# Patient Record
Sex: FEMALE | Race: BLACK OR AFRICAN AMERICAN | NOT HISPANIC OR LATINO | URBAN - METROPOLITAN AREA
[De-identification: names, ages, dates, MRNs, and addresses within clinical notes are randomized per-mention and may not be internally consistent; named-entity substitution may affect disease eponyms.]

---

## 2017-08-23 ENCOUNTER — EMERGENCY (EMERGENCY)
Facility: HOSPITAL | Age: 62
LOS: 1 days | Discharge: HOME | End: 2017-08-23
Attending: EMERGENCY MEDICINE
Payer: MEDICAID

## 2017-08-23 VITALS
TEMPERATURE: 98 F | HEIGHT: 69 IN | RESPIRATION RATE: 18 BRPM | DIASTOLIC BLOOD PRESSURE: 88 MMHG | OXYGEN SATURATION: 96 % | WEIGHT: 210.1 LBS | SYSTOLIC BLOOD PRESSURE: 142 MMHG | HEART RATE: 96 BPM

## 2017-08-23 PROCEDURE — 73562 X-RAY EXAM OF KNEE 3: CPT | Mod: 26,50

## 2017-08-23 PROCEDURE — 72100 X-RAY EXAM L-S SPINE 2/3 VWS: CPT | Mod: 26

## 2017-08-23 PROCEDURE — 99284 EMERGENCY DEPT VISIT MOD MDM: CPT

## 2017-08-23 PROCEDURE — 73630 X-RAY EXAM OF FOOT: CPT | Mod: 26,LT

## 2017-08-23 PROCEDURE — 73590 X-RAY EXAM OF LOWER LEG: CPT | Mod: 26,RT

## 2017-08-23 RX ORDER — CYCLOBENZAPRINE HYDROCHLORIDE 10 MG/1
10 TABLET, FILM COATED ORAL ONCE
Qty: 0 | Refills: 0 | Status: COMPLETED | OUTPATIENT
Start: 2017-08-23 | End: 2017-08-23

## 2017-08-23 RX ORDER — OXYCODONE AND ACETAMINOPHEN 5; 325 MG/1; MG/1
1 TABLET ORAL ONCE
Qty: 0 | Refills: 0 | Status: DISCONTINUED | OUTPATIENT
Start: 2017-08-23 | End: 2017-08-23

## 2017-08-23 RX ORDER — IBUPROFEN 200 MG
600 TABLET ORAL ONCE
Qty: 0 | Refills: 0 | Status: COMPLETED | OUTPATIENT
Start: 2017-08-23 | End: 2017-08-23

## 2017-08-23 RX ADMIN — Medication 600 MILLIGRAM(S): at 21:20

## 2017-08-23 RX ADMIN — Medication 600 MILLIGRAM(S): at 21:53

## 2017-08-23 RX ADMIN — OXYCODONE AND ACETAMINOPHEN 1 TABLET(S): 5; 325 TABLET ORAL at 21:54

## 2017-08-23 RX ADMIN — CYCLOBENZAPRINE HYDROCHLORIDE 10 MILLIGRAM(S): 10 TABLET, FILM COATED ORAL at 21:20

## 2017-08-23 NOTE — ED STATDOCS - PROGRESS NOTE DETAILS
PA NOTE: PT discussed with attending at length, HPI/ROS/PE confirmed. PT seen sitting in wheelchair in no acute distress, complaining of multiple injurys, PT admits to diff bearing wt, pt denies numbness, tingling, loss of sensation, change in bowl or urinary habits, HA, LOC, Head trama.  PE: MS: ROM limited due to pain, Strength intact, tenderness over b/l tibial plateau, LT ankle and midfoot. Skin: superficial aberrations to B/L tibial plateau.  PLAN: await results and reassess. PA NOTE: pt informed of xray findings, will be sent for CT to revaluate PA NOTE: PA NOTE: PT informed of all findings, PT seen resting comfortably in no acute distress, Pt will be Dc home with knee immobilizer, air cast, crutches to help ambulate, pain control, pt demonstrated ability to ambulate with crutches. pt will follow up with ortho, educated about when to return to the ED if needed. Pt verbalizes that she understands all instructions and results.

## 2017-08-23 NOTE — ED STATDOCS - MEDICAL DECISION MAKING DETAILS
Will obtain X-ray of b/l knee, back and b/l feet. Pt with no head trauma. Non-displaced fractures, seen by ortho and cleared for outpatient mgt. Neurovascular intact and pain well controlled.

## 2017-08-23 NOTE — ED STATDOCS - MUSCULOSKELETAL FINDINGS, MLM
neck supple/TENDERNESS/motor intact/tenderness on 5th metacarpal. tenderness on R tibial plateau./RANGE OF MOTION LIMITED

## 2017-08-23 NOTE — ED ADULT NURSE NOTE - OBJECTIVE STATEMENT
pt tripped over dog outside and hit knees on cement pt has abrasions bleeding minimally to b/l knees denieds blood thinners or head injury

## 2017-08-23 NOTE — ED STATDOCS - ATTENDING CONTRIBUTION TO CARE
I, Manoj Driver, performed the initial face to face bedside interview with this patient regarding history of present illness, review of symptoms and relevant past medical, social and family history.  I completed an independent physical examination.  I was the initial provider who evaluated this patient. I have signed out the follow up of any pending tests (i.e. labs, radiological studies) to the ACP.  I have communicated the patient’s plan of care and disposition with the ACP.

## 2017-08-23 NOTE — ED STATDOCS - CONDUCTED A DETAILED DISCUSSION WITH PATIENT AND/OR GUARDIAN REGARDING, MDM
radiology results need for outpatient follow-up/radiology results/return to ED if symptoms worsen, persist or questions arise

## 2017-08-23 NOTE — ED STATDOCS - OBJECTIVE STATEMENT
60 y/o F pt with a PMHx of DM, HTN presents to the ED c/o b/l LE pain and abrasions to b/l knee s/p fall today. She states that she tripped and fell over her dog down 5 stairs. She also reports back pain. Pt is requesting pain medication. Denies NUMBNESS, TINGLING, headache, neck pain, head trauma, n/v or any other complaints. NKDA.

## 2017-08-23 NOTE — ED ADULT TRIAGE NOTE - CHIEF COMPLAINT QUOTE
Patient arrived to ED today with c/o fall down 4 steps onto her knees.  Patient states pain to her legs bilaterally.  Patient denies LOC or hitting her head.

## 2017-08-24 ENCOUNTER — OTHER (OUTPATIENT)
Age: 62
End: 2017-08-24

## 2017-08-24 VITALS
SYSTOLIC BLOOD PRESSURE: 118 MMHG | OXYGEN SATURATION: 100 % | HEART RATE: 77 BPM | RESPIRATION RATE: 20 BRPM | DIASTOLIC BLOOD PRESSURE: 77 MMHG | TEMPERATURE: 99 F

## 2017-08-24 PROCEDURE — 73610 X-RAY EXAM OF ANKLE: CPT | Mod: 26,LT

## 2017-08-24 PROCEDURE — 73700 CT LOWER EXTREMITY W/O DYE: CPT

## 2017-08-24 PROCEDURE — 99284 EMERGENCY DEPT VISIT MOD MDM: CPT | Mod: 25

## 2017-08-24 PROCEDURE — 73610 X-RAY EXAM OF ANKLE: CPT

## 2017-08-24 PROCEDURE — 73700 CT LOWER EXTREMITY W/O DYE: CPT | Mod: 26,RT

## 2017-08-24 PROCEDURE — 73630 X-RAY EXAM OF FOOT: CPT

## 2017-08-24 PROCEDURE — 72100 X-RAY EXAM L-S SPINE 2/3 VWS: CPT

## 2017-08-24 PROCEDURE — 73562 X-RAY EXAM OF KNEE 3: CPT

## 2017-08-24 PROCEDURE — 73590 X-RAY EXAM OF LOWER LEG: CPT

## 2017-08-24 RX ORDER — CEPHALEXIN 500 MG
1 CAPSULE ORAL
Qty: 21 | Refills: 0 | OUTPATIENT
Start: 2017-08-24 | End: 2017-08-31

## 2017-08-24 RX ORDER — IBUPROFEN 200 MG
1 TABLET ORAL
Qty: 15 | Refills: 0 | OUTPATIENT
Start: 2017-08-24 | End: 2017-08-29

## 2017-08-24 NOTE — ED PROCEDURE NOTE - NS ED PERI VASCULAR NEG
no paresthesia/capillary refill time < 2 seconds/no swelling/fingers/toes warm to touch/no cyanosis of extremity

## 2017-08-24 NOTE — ED PROCEDURE NOTE - CPROC ED POST PROC CARE GUIDE1
Instructed patient/caregiver to follow-up with primary care physician./Verbal/written post procedure instructions were given to patient/caregiver./Keep the cast/splint/dressing clean and dry./Instructed patient/caregiver regarding signs and symptoms of infection./Elevate the injured extremity as instructed.

## 2017-08-24 NOTE — ED PROCEDURE NOTE - NS ED PERI VASCULAR NEG
no cyanosis of extremity/capillary refill time < 2 seconds/no swelling/no paresthesia/fingers/toes warm to touch

## 2017-08-31 ENCOUNTER — APPOINTMENT (OUTPATIENT)
Dept: ORTHOPEDIC SURGERY | Facility: CLINIC | Age: 62
End: 2017-08-31
Payer: MEDICAID

## 2017-08-31 VITALS
HEART RATE: 103 BPM | DIASTOLIC BLOOD PRESSURE: 87 MMHG | SYSTOLIC BLOOD PRESSURE: 148 MMHG | BODY MASS INDEX: 31.1 KG/M2 | HEIGHT: 69 IN | WEIGHT: 210 LBS

## 2017-08-31 DIAGNOSIS — Z86.39 PERSONAL HISTORY OF OTHER ENDOCRINE, NUTRITIONAL AND METABOLIC DISEASE: ICD-10-CM

## 2017-08-31 DIAGNOSIS — Z78.9 OTHER SPECIFIED HEALTH STATUS: ICD-10-CM

## 2017-08-31 DIAGNOSIS — S93.409A SPRAIN OF UNSPECIFIED LIGAMENT OF UNSPECIFIED ANKLE, INITIAL ENCOUNTER: ICD-10-CM

## 2017-08-31 DIAGNOSIS — F19.90 OTHER PSYCHOACTIVE SUBSTANCE USE, UNSPECIFIED, UNCOMPLICATED: ICD-10-CM

## 2017-08-31 DIAGNOSIS — Z87.891 PERSONAL HISTORY OF NICOTINE DEPENDENCE: ICD-10-CM

## 2017-08-31 DIAGNOSIS — Z86.79 PERSONAL HISTORY OF OTHER DISEASES OF THE CIRCULATORY SYSTEM: ICD-10-CM

## 2017-08-31 DIAGNOSIS — S82.154A NONDISPLACED FRACTURE OF RIGHT TIBIAL TUBEROSITY, INITIAL ENCOUNTER FOR CLOSED FRACTURE: ICD-10-CM

## 2017-08-31 PROCEDURE — 99203 OFFICE O/P NEW LOW 30 MIN: CPT | Mod: 25

## 2017-08-31 PROCEDURE — 29505 APPLICATION LONG LEG SPLINT: CPT | Mod: RT

## 2017-08-31 RX ORDER — INSULIN GLARGINE 100 [IU]/ML
INJECTION, SOLUTION SUBCUTANEOUS
Refills: 0 | Status: ACTIVE | COMMUNITY

## 2017-08-31 RX ORDER — LOSARTAN POTASSIUM 100 MG/1
TABLET, FILM COATED ORAL
Refills: 0 | Status: ACTIVE | COMMUNITY

## 2017-08-31 RX ORDER — ATORVASTATIN CALCIUM 80 MG/1
TABLET, FILM COATED ORAL
Refills: 0 | Status: ACTIVE | COMMUNITY

## 2017-09-13 ENCOUNTER — APPOINTMENT (OUTPATIENT)
Dept: ORTHOPEDIC SURGERY | Facility: CLINIC | Age: 62
End: 2017-09-13

## 2017-09-21 ENCOUNTER — APPOINTMENT (OUTPATIENT)
Dept: ORTHOPEDIC SURGERY | Facility: CLINIC | Age: 62
End: 2017-09-21
Payer: MEDICAID

## 2017-09-21 VITALS
WEIGHT: 210 LBS | HEART RATE: 93 BPM | DIASTOLIC BLOOD PRESSURE: 88 MMHG | BODY MASS INDEX: 31.1 KG/M2 | SYSTOLIC BLOOD PRESSURE: 138 MMHG | HEIGHT: 69 IN

## 2017-09-21 PROCEDURE — 99213 OFFICE O/P EST LOW 20 MIN: CPT

## 2017-09-21 PROCEDURE — 73560 X-RAY EXAM OF KNEE 1 OR 2: CPT | Mod: RT

## 2017-09-21 RX ORDER — METFORMIN HYDROCHLORIDE 625 MG/1
TABLET ORAL
Refills: 0 | Status: DISCONTINUED | COMMUNITY
End: 2017-09-21

## 2017-09-21 RX ORDER — AZELASTINE HYDROCHLORIDE 205.5 UG/1
0.15 SPRAY, METERED NASAL
Qty: 30 | Refills: 0 | Status: ACTIVE | COMMUNITY
Start: 2017-02-14

## 2017-09-21 RX ORDER — INSULIN LISPRO 100 [IU]/ML
100 INJECTION, SOLUTION INTRAVENOUS; SUBCUTANEOUS
Qty: 15 | Refills: 0 | Status: ACTIVE | COMMUNITY
Start: 2017-06-05

## 2017-09-21 RX ORDER — EMPAGLIFLOZIN 10 MG/1
10 TABLET, FILM COATED ORAL
Qty: 30 | Refills: 0 | Status: ACTIVE | COMMUNITY
Start: 2017-02-08

## 2017-09-21 RX ORDER — SITAGLIPTIN 100 MG/1
100 TABLET, FILM COATED ORAL
Qty: 30 | Refills: 0 | Status: ACTIVE | COMMUNITY
Start: 2017-07-21

## 2017-09-21 RX ORDER — METFORMIN ER 500 MG 500 MG/1
500 TABLET ORAL
Qty: 60 | Refills: 0 | Status: ACTIVE | COMMUNITY
Start: 2017-04-05

## 2017-10-12 ENCOUNTER — APPOINTMENT (OUTPATIENT)
Dept: ORTHOPEDIC SURGERY | Facility: CLINIC | Age: 62
End: 2017-10-12
Payer: MEDICAID

## 2017-10-12 VITALS
HEART RATE: 93 BPM | BODY MASS INDEX: 31.1 KG/M2 | WEIGHT: 210 LBS | SYSTOLIC BLOOD PRESSURE: 141 MMHG | HEIGHT: 69 IN | DIASTOLIC BLOOD PRESSURE: 76 MMHG

## 2017-10-12 DIAGNOSIS — S82.154D: ICD-10-CM

## 2017-10-12 PROCEDURE — 99213 OFFICE O/P EST LOW 20 MIN: CPT

## 2017-10-12 PROCEDURE — 73562 X-RAY EXAM OF KNEE 3: CPT | Mod: RT

## 2017-10-14 ENCOUNTER — FORM ENCOUNTER (OUTPATIENT)
Age: 62
End: 2017-10-14

## 2017-10-15 ENCOUNTER — APPOINTMENT (OUTPATIENT)
Dept: MRI IMAGING | Facility: CLINIC | Age: 62
End: 2017-10-15
Payer: MEDICAID

## 2017-10-15 ENCOUNTER — OUTPATIENT (OUTPATIENT)
Dept: OUTPATIENT SERVICES | Facility: HOSPITAL | Age: 62
LOS: 1 days | End: 2017-10-15
Payer: MEDICAID

## 2017-10-15 DIAGNOSIS — S82.154D: ICD-10-CM

## 2017-10-15 PROCEDURE — 73721 MRI JNT OF LWR EXTRE W/O DYE: CPT

## 2017-10-15 PROCEDURE — 73721 MRI JNT OF LWR EXTRE W/O DYE: CPT | Mod: 26,RT

## 2017-12-28 ENCOUNTER — OTHER (OUTPATIENT)
Age: 62
End: 2017-12-28

## 2018-09-17 NOTE — ED ADULT NURSE NOTE - PSH
HPI:    Patient ID: Rupinder Whitney is a 30year old male. HPI     Patient had seen Dr Car he for left foot pain attributed to gout  flare. Stated pain resolved immediately with medrol dose pack.  He was drinking beer and eating fast food with cheesebu breath sounds normal.   Abdominal: Soft. Bowel sounds are normal. He exhibits no distension and no mass. There is no hepatosplenomegaly. No hernia. Genitourinary:   Genitourinary Comments: varicocele   Musculoskeletal: Normal range of motion.    Neurologi No significant past surgical history

## 2019-03-21 ENCOUNTER — RESULT REVIEW (OUTPATIENT)
Age: 64
End: 2019-03-21

## 2019-10-23 NOTE — CONSULT NOTE ADULT - SUBJECTIVE AND OBJECTIVE BOX
Pt Name: JORGE CORDOBA    MRN: [id number]      Patient is a 61y Female presenting to the emergency department with a chief complaint of Patient is a 61y old  Female who presents with a chief complaint of Right knee pain and Left ankle/foot pain since earlier today s/p trip and fall. Patient localizes pain at the tibial tubercle. notes abrasion.  denies joint line pain or knee joint pain.  denies patellar pain.  c/o left lateral ankle and lateral dorsolateral foot pain.  denies paresthesias to b/l LE    HEALTH ISSUES - PROBLEM Dx:      .      REVIEW OF SYSTEMS      General:	    Skin/Breast:  	  Ophthalmologic:  	  ENMT:	    Respiratory and Thorax:  	  Cardiovascular:	    Gastrointestinal:	    Genitourinary:	    Musculoskeletal: Right knee and left ankle pain	    Neurological:	    Psychiatric:	    Hematology/Lymphatics:	    Endocrine:	    Allergic/Immunologic:	    ROS is otherwise negative.    PAST MEDICAL & SURGICAL HISTORY:  PAST MEDICAL & SURGICAL HISTORY:  DM (diabetes mellitus)  No significant past surgical history      Allergies: No Known Allergies      Medications:     FAMILY HISTORY:  : non-contributory    Social History:     Ambulation: Walking independently [X] With Cane [ ] With Walker [ ]  Bedbound [ ]               PHYSICAL EXAM:    Vital Signs Last 24 Hrs  T(C): 37.1 (24 Aug 2017 00:40), Max: 37.1 (24 Aug 2017 00:40)  T(F): 98.7 (24 Aug 2017 00:40), Max: 98.7 (24 Aug 2017 00:40)  HR: 77 (24 Aug 2017 00:40) (77 - 96)  BP: 118/77 (24 Aug 2017 00:40) (118/77 - 142/88)  BP(mean): --  RR: 20 (24 Aug 2017 00:40) (18 - 20)  SpO2: 100% (24 Aug 2017 00:40) (96% - 100%)  Daily Height in cm: 175.26 (23 Aug 2017 18:10)    Daily     Appearance: Alert, responsive, in no acute distress.    Neurological: Sensation is grossly intact to light touch. 5/5 motor function of all extremities. No focal deficits or weaknesses found.    Skin: no rash on visible skin. Skin is clean, dry and intact. No bleeding. No abrasions. No ulcerations.    Vascular: 2+ distal pulses. Cap refill < 2 sec. No signs of venous insuffiency or stasis. No extremity ulcerations. No cyanosis.    Musculoskeletal:         Left Upper Extremity:       Right Upper Extremity:       Left Lower Extremity: Minimal swelling noted anterolateral ankle. positive TTP ATF. no TTP lateral med mall or deltoid ligament.  ROM 20 dorsi/50 plantar flexion. mild to mod TTP at the dorsolateral midfoot. NVID calf soft NT no TTP knee/fibular head       Right Lower Extremity: Right knee: swelling noted over tibial tubercle associated with abrasion. no foreign bodies noted. no warmth erythema or discharge.  positive TTP tibial tubercle. p;atellar tendon is palpable. patient is able to SLR against gravity without significant pain.  no TTP patella/quad/quad tendon.  knee extended to 10 degrees. passively extends fully. unable to assess drawer and laxity due to pain and guarding. calf soft NT NVID    Imaging Studies: ap/lat/oblique Right knee, non displaced frx of tibial tubercle exostosis. no other frx dislocations noted.  no patella eli/baja noted  ap/lat/oblique taken Left knee show patellar traction spurring. no acute frx dislocations noted  ap/lat/oblique ankle/foot show no acute frx dislocations. calcaneal spurring noted.    A/P:  Pt is a  61y Female with Patient is a 61y old  Female who presents with a chief complaint of  found to have Left ankle/foot sprain and Right tibial tubercle fracture    PLAN:   Patient D/W DELFINO Webb.  Dx and trx options discussed with patient   Apply Knee immobilizer to RLE. KEEP KNEE IMMOBILIZER ON AT ALL TIMES.  Do not remove for shower or sleep.  Patient reports she does not smoke.  WBAT in Knee immobilizer  Apply Aircast to the Left ankle. WBAT Left LE with aircast  ice and elevate LLE  Recommend PO Abx for Right pre tibial abrasion for infection prophylaxis   F/u with Dr Copeland this week  Patient understands and agrees with plan History of SI, denies plan or intent

## 2020-01-07 NOTE — ED PROCEDURE NOTE - NS ED APPLIED SPLINTS 1
Patient Education     Bladder Infection, Female (Adult)    Urine is normally doesn't have any bacteria in it. But bacteria can get into the urinary tract from the skin around the rectum. Or they can travel in the blood from elsewhere in the body. Once they are in your urinary tract, they can cause infection in the urethra (urethritis), the bladder (cystitis), or the kidneys (pyelonephritis).  The most common place for an infection is in the bladder. This is called a bladder infection. This is one of the most common infections in women. Most bladder infections are easily treated. They are not serious unless the infection spreads to the kidney.  The phrases \"bladder infection,\" \"UTI,\" and \"cystitis\" are often used to describe the same thing. But they are not always the same. Cystitis is an inflammation of the bladder. The most common cause of cystitis is an infection.  Symptoms  The infection causes inflammation in the urethra and bladder. This causes many of the symptoms. The most common symptoms of a bladder infection are:  · Pain or burning when urinating  · Having to urinate more often than usual  · Urgent need to urinate  · Only a small amount of urine comes out  · Blood in urine  · Abdominal discomfort. This is usually in the lower abdomen above the pubic bone.  · Cloudy urine  · Strong- or bad-smelling urine  · Unable to urinate (urinary retention)  · Unable to hold urine in (urinary incontinence)  · Fever  · Loss of appetite  · Confusion (in older adults)  Causes  Bladder infections are not contagious. You can't get one from someone else, from a toilet seat, or from sharing a bath.  The most common cause of bladder infections is bacteria from the bowels. The bacteria get onto the skin around the opening of the urethra. From there, they can get into the urine and travel up to the bladder, causing inflammation and infection. This usually happens because of:  · Wiping improperly after urinating. Always wipe from  front to back.  · Bowel incontinence  · Pregnancy  · Procedures such as having a catheter inserted  · Older age  · Not emptying your bladder. This can allow bacteria a chance to grow in your urine.  · Dehydration  · Constipation  · Sex  · Use of a diaphragm for birth control   Treatment  Bladder infections are diagnosed by a urine test. They are treated with antibiotics and usually clear up quickly without complications. Treatment helps prevent a more serious kidney infection.  Medicines  Medicines can help in the treatment of a bladder infection:  · Take antibiotics until they are used up, even if you feel better. It is important to finish them to make sure the infection has cleared.  · You can use acetaminophen or ibuprofen for pain, fever, or discomfort, unless another medicine was prescribed. If you have chronic liver or kidney disease, talk with your healthcare provider before using these medicines. Also talk with your provider if you've ever had a stomach ulcer or gastrointestinal bleeding, or are taking blood-thinner medicines.  · If you are given phenazopydridine to reduce burning with urination, it will cause your urine to become a bright orange color. This can stain clothing.  Care and prevention  These self-care steps can help prevent future infections:  · Drink plenty of fluids to prevent dehydration and flush out your bladder. Do this unless you must restrict fluids for other health reasons, or your doctor told you not to.  · Proper cleaning after going to the bathroom is important. Wipe from front to back after using the toilet to prevent the spread of bacteria.  · Urinate more often. Don't try to hold urine in for a long time.  · Wear loose-fitting clothes and cotton underwear. Avoid tight-fitting pants.  · Improve your diet and prevent constipation. Eat more fresh fruit and vegetables, and fiber, and less junk and fatty foods.  · Avoid sex until your symptoms are gone.  · Avoid caffeine, alcohol, and  spicy foods. These can irritate your bladder.  · Urinate right after intercourse to flush out your bladder.  · If you use birth control pills and have frequent bladder infections, discuss it with your doctor.  Follow-up care  Call your healthcare provider if all symptoms are not gone after 3 days of treatment. This is especially important if you have repeat infections.  If a culture was done, you will be told if your treatment needs to be changed. If directed, you can call to find out the results.  If X-rays were done, you will be told if the results will affect your treatment.  Call 911  Call 911 if any of the following occur:  · Trouble breathing  · Hard to wake up or confusion  · Fainting or loss of consciousness  · Rapid heart rate  When to seek medical advice  Call your healthcare provider right away if any of these occur:  · Fever of 100.4ºF (38.0ºC) or higher, or as directed by your healthcare provider  · Symptoms are not better by the third day of treatment  · Back or belly (abdominal) pain that gets worse  · Repeated vomiting, or unable to keep medicine down  · Weakness or dizziness  · Vaginal discharge  · Pain, redness, or swelling in the outer vaginal area (labia)  Date Last Reviewed: 10/1/2016  © 9857-9737 [employer]. [address] [place] [address], [place] [address]. All rights reserved. This information is not intended as a substitute for professional medical care. Always follow your healthcare professional's instructions.            Velcro Ankle Splint

## 2021-04-23 PROBLEM — I10 ESSENTIAL (PRIMARY) HYPERTENSION: Chronic | Status: ACTIVE | Noted: 2017-08-24

## 2021-04-29 ENCOUNTER — RESULT REVIEW (OUTPATIENT)
Age: 66
End: 2021-04-29

## 2021-06-08 ENCOUNTER — APPOINTMENT (OUTPATIENT)
Dept: PULMONOLOGY | Facility: CLINIC | Age: 66
End: 2021-06-08
Payer: MEDICAID

## 2021-06-08 VITALS
HEART RATE: 78 BPM | HEIGHT: 69 IN | DIASTOLIC BLOOD PRESSURE: 76 MMHG | OXYGEN SATURATION: 98 % | TEMPERATURE: 98 F | RESPIRATION RATE: 14 BRPM | WEIGHT: 210 LBS | BODY MASS INDEX: 31.1 KG/M2 | SYSTOLIC BLOOD PRESSURE: 124 MMHG

## 2021-06-08 DIAGNOSIS — U07.1 COVID-19: ICD-10-CM

## 2021-06-08 DIAGNOSIS — J12.82 COVID-19: ICD-10-CM

## 2021-06-08 DIAGNOSIS — G47.33 OBSTRUCTIVE SLEEP APNEA (ADULT) (PEDIATRIC): ICD-10-CM

## 2021-06-08 PROCEDURE — 99204 OFFICE O/P NEW MOD 45 MIN: CPT

## 2021-06-08 RX ORDER — IBUPROFEN 800 MG/1
800 TABLET, FILM COATED ORAL TWICE DAILY
Qty: 60 | Refills: 0 | Status: ACTIVE | COMMUNITY
Start: 2017-08-31

## 2021-06-08 RX ORDER — OXYCODONE AND ACETAMINOPHEN 5; 325 MG/1; MG/1
5-325 TABLET ORAL
Qty: 20 | Refills: 0 | Status: DISCONTINUED | COMMUNITY
Start: 2017-08-31 | End: 2021-06-08

## 2021-06-08 NOTE — ASSESSMENT
[FreeTextEntry1] : The patient is status post covid 19 pneumonia. She likely has some post-COVID deconditioning. We'll check pulmonary function studies to rule out ongoing lung disease. Also followup chest x-ray should be done at her primary physician's office.\par \par Patient likely has significant obstructive sleep apnea. Polysomnogram has been ordered.\par \par Patient will return after the polysomnogram is interpreted and we will review the pulmonary function studies an x-ray at that time.

## 2021-06-08 NOTE — HISTORY OF PRESENT ILLNESS
[TextBox_4] : The patient is a 65-year-old female who has been complaining of some shortness of breath climbing stairs. In February she was hospitalized at [facility] with covid19. I reviewed her hospital chart extensively. I reviewed imaging as well. The patient had some increased interstitial markings on her chest x-ray. Her saturation was 85% on admission. Her labs revealed an elevated sedimentation rate, elevated CRP, and a mildly elevated d-dimer which returned to normal during the hospitalization. She was in the hospital for about 5 days. She received Remdesivir. She has reported some residual dyspnea on exertion but without cough.\par \par The patient reports a long history of excessive daytime sleepiness. She is obese and has been told that she snores and stops breathing in her sleep. [ESS] : 12

## 2021-06-08 NOTE — PHYSICAL EXAM
[No Acute Distress] : no acute distress [Normal Oropharynx] : normal oropharynx [Low Lying Soft Palate] : low lying soft palate [Elongated Uvula] : elongated uvula [II] : Mallampati Class: II [Normal Appearance] : normal appearance [No Neck Mass] : no neck mass [Normal Rate/Rhythm] : normal rate/rhythm [Normal S1, S2] : normal s1, s2 [No Murmurs] : no murmurs [No Resp Distress] : no resp distress [Clear to Auscultation Bilaterally] : clear to auscultation bilaterally [No Abnormalities] : no abnormalities [Benign] : benign [Normal Gait] : normal gait [No Clubbing] : no clubbing [No Cyanosis] : no cyanosis [No Edema] : no edema [FROM] : FROM [Normal Color/ Pigmentation] : normal color/ pigmentation [No Focal Deficits] : no focal deficits [Oriented x3] : oriented x3 [Normal Affect] : normal affect [TextBox_2] : lucas

## 2021-06-21 ENCOUNTER — APPOINTMENT (OUTPATIENT)
Age: 66
End: 2021-06-21

## 2021-06-26 ENCOUNTER — EMERGENCY (EMERGENCY)
Facility: HOSPITAL | Age: 66
LOS: 1 days | Discharge: HOME | End: 2021-06-26
Attending: EMERGENCY MEDICINE
Payer: COMMERCIAL

## 2021-06-26 VITALS
TEMPERATURE: 98 F | OXYGEN SATURATION: 99 % | DIASTOLIC BLOOD PRESSURE: 100 MMHG | RESPIRATION RATE: 20 BRPM | HEIGHT: 69 IN | HEART RATE: 91 BPM | SYSTOLIC BLOOD PRESSURE: 164 MMHG | WEIGHT: 205.03 LBS

## 2021-06-26 VITALS
DIASTOLIC BLOOD PRESSURE: 99 MMHG | OXYGEN SATURATION: 99 % | RESPIRATION RATE: 18 BRPM | HEART RATE: 83 BPM | SYSTOLIC BLOOD PRESSURE: 163 MMHG

## 2021-06-26 PROCEDURE — 99283 EMERGENCY DEPT VISIT LOW MDM: CPT | Mod: 25

## 2021-06-26 PROCEDURE — 72220 X-RAY EXAM SACRUM TAILBONE: CPT

## 2021-06-26 PROCEDURE — 99284 EMERGENCY DEPT VISIT MOD MDM: CPT

## 2021-06-26 PROCEDURE — 72220 X-RAY EXAM SACRUM TAILBONE: CPT | Mod: 26

## 2021-06-26 RX ORDER — LIDOCAINE 4 G/100G
1 CREAM TOPICAL ONCE
Refills: 0 | Status: COMPLETED | OUTPATIENT
Start: 2021-06-26 | End: 2021-06-26

## 2021-06-26 RX ORDER — METHOCARBAMOL 500 MG/1
750 TABLET, FILM COATED ORAL ONCE
Refills: 0 | Status: COMPLETED | OUTPATIENT
Start: 2021-06-26 | End: 2021-06-26

## 2021-06-26 RX ORDER — IBUPROFEN 200 MG
1 TABLET ORAL
Qty: 20 | Refills: 0
Start: 2021-06-26 | End: 2021-06-30

## 2021-06-26 RX ORDER — IBUPROFEN 200 MG
600 TABLET ORAL ONCE
Refills: 0 | Status: COMPLETED | OUTPATIENT
Start: 2021-06-26 | End: 2021-06-26

## 2021-06-26 RX ORDER — METHOCARBAMOL 500 MG/1
2 TABLET, FILM COATED ORAL
Qty: 40 | Refills: 0
Start: 2021-06-26 | End: 2021-06-30

## 2021-06-26 RX ADMIN — LIDOCAINE 1 PATCH: 4 CREAM TOPICAL at 12:35

## 2021-06-26 RX ADMIN — Medication 600 MILLIGRAM(S): at 12:34

## 2021-06-26 RX ADMIN — METHOCARBAMOL 750 MILLIGRAM(S): 500 TABLET, FILM COATED ORAL at 12:34

## 2021-06-26 NOTE — ED PROVIDER NOTE - PATIENT PORTAL LINK FT
You can access the FollowMyHealth Patient Portal offered by [facility] by registering at the following website: [email or web address]. By joining [facility]’s FollowMyHealth portal, you will also be able to view your health information using other applications (apps) compatible with our system. You can access the FollowMyHealth Patient Portal offered by [facility] by registering at the following website: [email or web address]. By joining [facility]’s FollowMyHealth portal, you will also be able to view your health information using other applications (apps) compatible with our system.
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
## 2021-06-26 NOTE — ED PROVIDER NOTE - NEUROLOGICAL, MLM
Alert and oriented, no focal deficits, no motor or sensory deficits. ambulatory with steady gait, 5 + strength b/l UE/LE

## 2021-06-26 NOTE — ED PROVIDER NOTE - MUSCULOSKELETAL, MLM
Spine appears normal, no midline vertebral tenderness, range of motion is not limited, + TTP of superior gluts b/l, no ligamentous laxity
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
## 2021-06-28 ENCOUNTER — APPOINTMENT (OUTPATIENT)
Dept: RHEUMATOLOGY | Facility: CLINIC | Age: 66
End: 2021-06-28

## 2021-08-09 ENCOUNTER — APPOINTMENT (OUTPATIENT)
Dept: RHEUMATOLOGY | Facility: CLINIC | Age: 66
End: 2021-08-09

## 2021-10-06 PROBLEM — U07.1 PNEUMONIA DUE TO COVID-19 VIRUS: Status: ACTIVE | Noted: 2021-06-08

## 2021-11-08 ENCOUNTER — APPOINTMENT (OUTPATIENT)
Dept: OTOLARYNGOLOGY | Facility: CLINIC | Age: 66
End: 2021-11-08

## 2022-01-12 ENCOUNTER — OUTPATIENT (OUTPATIENT)
Dept: OUTPATIENT SERVICES | Facility: HOSPITAL | Age: 67
LOS: 1 days | End: 2022-01-12
Payer: MEDICAID

## 2022-01-12 DIAGNOSIS — R06.00 DYSPNEA, UNSPECIFIED: ICD-10-CM

## 2022-01-12 DIAGNOSIS — Z01.810 ENCOUNTER FOR PREPROCEDURAL CARDIOVASCULAR EXAMINATION: ICD-10-CM

## 2022-01-12 PROCEDURE — 78452 HT MUSCLE IMAGE SPECT MULT: CPT

## 2022-01-12 PROCEDURE — A9500: CPT

## 2022-01-12 PROCEDURE — 93018 CV STRESS TEST I&R ONLY: CPT

## 2022-01-12 PROCEDURE — 78452 HT MUSCLE IMAGE SPECT MULT: CPT | Mod: 26

## 2022-01-12 PROCEDURE — 93017 CV STRESS TEST TRACING ONLY: CPT

## 2022-01-12 PROCEDURE — 93016 CV STRESS TEST SUPVJ ONLY: CPT

## 2022-08-08 ENCOUNTER — APPOINTMENT (OUTPATIENT)
Dept: BARIATRICS/WEIGHT MGMT | Facility: CLINIC | Age: 67
End: 2022-08-08

## 2022-08-19 ENCOUNTER — APPOINTMENT (OUTPATIENT)
Dept: ORTHOPEDIC SURGERY | Facility: CLINIC | Age: 67
End: 2022-08-19

## 2022-08-19 DIAGNOSIS — M25.552 PAIN IN LEFT HIP: ICD-10-CM

## 2022-08-19 PROCEDURE — 99203 OFFICE O/P NEW LOW 30 MIN: CPT

## 2022-08-19 PROCEDURE — 73502 X-RAY EXAM HIP UNI 2-3 VIEWS: CPT | Mod: LT

## 2022-08-19 NOTE — PHYSICAL EXAM
[Antalgic] : antalgic [LE] : Sensory: Intact in bilateral lower extremities [ALL] : dorsalis pedis, posterior tibial, femoral, popliteal, and radial 2+ and symmetric bilaterally [de-identified] : On physical examination of the left hip. The skin is clean dry and intact without erythema swelling or heat noted. There is significant discomfort on the lateral aspect of the hip but the majority is in the posterior aspect. She does have adequate range of motion in all planes both passively and actively. However performing hip flexion or even a leg extension. Elicits marked discomfort in the posterior aspect. There is no length discrepancy. No evidence of any muscle atrophy. No evidence of any motor or sensory deficit. However upon further evaluation as the patient was on a supine position laying on her stomach. She does have significant discomfort and pain in the lower back as well as both paraspinal muscles. This was retracted towards the right but mostly left sciatic nerve she does have a positive Tinel's test and Lesaguge test [de-identified] : X-rays of the left hip are negative

## 2022-08-19 NOTE — HISTORY OF PRESENT ILLNESS
[de-identified] : The patient is a 66-year-old female who presents today for evaluation regarding her left hip. She states she's had pain for several months now and is progressively getting worse. She describes it as a persistent discomfort which occurs with strenuous activities. Although she does have some pain on the lateral aspect of the majority of her discomfort is posteriorly. She states that sitting standing walking even bending creates increased discomfort. She also states that this pain radiates down towards her right but mostly her left lower extremity. She denies any history of injury or accident. Denies any pain in her groin [Worsening] : worsening [Walking] : walking [Sitting] : sitting [Standing] : standing [Intermit.] : ~He/She~ states the symptoms seem to be intermittent

## 2022-08-19 NOTE — DISCUSSION/SUMMARY
[de-identified] : After a thorough history full examination and review of x-rays. It was explained to the patient she is doing very well regarding her left hip. Although she had some mild numbness on the lateral aspect localized at the greater trochanteric bursa. The complete c of discomfort is coming from her lower back. She is strongly advised to continue with conservative management including ice/heat and anti-inflammatories. It is also strongly suggested that she follow the back specialist. He was at offered to see Dr. Vernon Otero here in the office for further evaluation and management

## 2022-09-20 ENCOUNTER — APPOINTMENT (OUTPATIENT)
Dept: ORTHOPEDIC SURGERY | Facility: CLINIC | Age: 67
End: 2022-09-20

## 2022-09-20 VITALS
SYSTOLIC BLOOD PRESSURE: 146 MMHG | TEMPERATURE: 98.1 F | WEIGHT: 225 LBS | DIASTOLIC BLOOD PRESSURE: 90 MMHG | BODY MASS INDEX: 33.33 KG/M2 | HEIGHT: 69 IN

## 2022-09-20 DIAGNOSIS — M47.816 SPONDYLOSIS W/OUT MYELOPATHY OR RADICULOPATHY, LUMBAR REGION: ICD-10-CM

## 2022-09-20 PROCEDURE — 72100 X-RAY EXAM L-S SPINE 2/3 VWS: CPT

## 2022-09-20 PROCEDURE — 99204 OFFICE O/P NEW MOD 45 MIN: CPT

## 2022-09-20 NOTE — PHYSICAL EXAM
[Antalgic] : antalgic [de-identified] : CONSTITUTIONAL: The patient is a very pleasant individual who is well-nourished and who appears stated age.\par PSYCHIATRIC: The patient is alert and oriented X 3 and in no apparent distress, and participates with orthopedic evaluation well.\par HEAD: Atraumatic and is nonsyndromic in appearance.\par EENT: No visible thyromegaly, EOMI.\par RESPIRATORY: Respiratory rate is regular, not dyspneic on examination.\par LYMPHATICS: There is no inguinal lymphadenopathy\par INTEGUMENTARY: Skin is clean, dry, and intact about the bilateral lower extremities and lumbar spine.\par VASCULAR: There is brisk capillary refill about the bilateral lower extremities.\par NEUROLOGIC: There are no pathologic reflexes. There is no decrease in sensation of the bilateral lower extremities on Wartenberg pinwheel examination. Deep tendon reflexes are well maintained at 2+/4 of the bilateral lower extremities and are symmetric..\par MUSCULOSKELETAL: There is no visible muscular atrophy. Manual motor strength is well maintained in the bilateral lower extremities. Range of motion of lumbar spine is well maintained. The patient ambulates in a non-myelopathic manner. Negative tension sign and straight leg raise bilaterally. Quad extension, ankle dorsiflexion, EHL, plantar flexion, and ankle eversion are well preserved. Normal secondary orthopaedic exam of bilateral hips, greater trochanteric area, knees and ankles, mechanically orientated low back pain [de-identified] : X-rays been reviewed of the lumbar spine shows a grade 1 spondylolisthesis working to determine L4-L5 loss of disc height at L5-S1 near bone-on-bone as well as lumbar spondylosis at 3423 etc.
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
## 2022-10-14 NOTE — CONSULT LETTER
[Dear  ___] : Dear  [unfilled], [Consult Letter:] : I had the pleasure of evaluating your patient, [unfilled]. [Please see my note below.] : Please see my note below. [Consult Closing:] : Thank you very much for allowing me to participate in the care of this patient.  If you have any questions, please do not hesitate to contact me. [Sincerely,] : Sincerely, [FreeTextEntry3] : Scarlett Choi MD FCCP\par D-ABSM\par [employer] board certified in  Pulmonary diseases, Sleep medicine\par Internal medicine\par  Otolaryngologist Procedure Text (A): After obtaining clear surgical margins the patient was sent to otolaryngology for surgical repair.  The patient understands they will receive post-surgical care and follow-up from the referring physician's office.

## 2022-11-11 ENCOUNTER — APPOINTMENT (OUTPATIENT)
Dept: BARIATRICS/WEIGHT MGMT | Facility: CLINIC | Age: 67
End: 2022-11-11

## 2025-02-25 ENCOUNTER — OFFICE (OUTPATIENT)
Dept: URBAN - METROPOLITAN AREA CLINIC 6 | Facility: CLINIC | Age: 70
Setting detail: OPHTHALMOLOGY
End: 2025-02-25
Payer: MEDICARE

## 2025-02-25 ENCOUNTER — RX ONLY (RX ONLY)
Age: 70
End: 2025-02-25

## 2025-02-25 DIAGNOSIS — H16.223: ICD-10-CM

## 2025-02-25 DIAGNOSIS — E11.9: ICD-10-CM

## 2025-02-25 DIAGNOSIS — H25.13: ICD-10-CM

## 2025-02-25 PROCEDURE — 92004 COMPRE OPH EXAM NEW PT 1/>: CPT | Performed by: OPHTHALMOLOGY

## 2025-02-25 ASSESSMENT — KERATOMETRY
OS_K2POWER_DIOPTERS: 43.75
OS_AXISANGLE_DEGREES: 026
OD_AXISANGLE_DEGREES: 158
OS_K1POWER_DIOPTERS: 42.50
OD_K2POWER_DIOPTERS: 44.00
METHOD_AUTO_MANUAL: AUTO
OD_K1POWER_DIOPTERS: 42.25

## 2025-02-25 ASSESSMENT — REFRACTION_MANIFEST
OU_VA: 20/25
OD_CYLINDER: -1.25
OD_VA1: 20/25
OS_VA1: 20/25
OS_AXIS: 050
OD_SPHERE: +2.00
OS_SPHERE: +1.50
OS_CYLINDER: -1.50
OD_AXIS: 125

## 2025-02-25 ASSESSMENT — VISUAL ACUITY
OD_BCVA: 20/50
OS_BCVA: 20/40-1

## 2025-02-25 ASSESSMENT — REFRACTION_AUTOREFRACTION
OD_AXIS: 125
OD_SPHERE: +2.00
OS_CYLINDER: -2.00
OS_AXIS: 050
OD_CYLINDER: -1.75
OS_SPHERE: +1.50

## 2025-02-25 ASSESSMENT — CONFRONTATIONAL VISUAL FIELD TEST (CVF)
OD_FINDINGS: FULL
OS_FINDINGS: FULL

## 2025-02-25 ASSESSMENT — TONOMETRY
OD_IOP_MMHG: 15
OS_IOP_MMHG: 14

## 2025-02-25 ASSESSMENT — SUPERFICIAL PUNCTATE KERATITIS (SPK)
OS_SPK: 2+
OD_SPK: 2+